# Patient Record
Sex: MALE | Race: WHITE | NOT HISPANIC OR LATINO | Employment: FULL TIME | URBAN - METROPOLITAN AREA
[De-identification: names, ages, dates, MRNs, and addresses within clinical notes are randomized per-mention and may not be internally consistent; named-entity substitution may affect disease eponyms.]

---

## 2017-03-02 ENCOUNTER — AMBULATORY - HEALTHEAST (OUTPATIENT)
Dept: FAMILY MEDICINE | Facility: CLINIC | Age: 27
End: 2017-03-02

## 2017-03-02 ENCOUNTER — COMMUNICATION - HEALTHEAST (OUTPATIENT)
Dept: FAMILY MEDICINE | Facility: CLINIC | Age: 27
End: 2017-03-02

## 2018-01-25 ENCOUNTER — COMMUNICATION - HEALTHEAST (OUTPATIENT)
Dept: SCHEDULING | Facility: CLINIC | Age: 28
End: 2018-01-25

## 2018-01-30 ENCOUNTER — COMMUNICATION - HEALTHEAST (OUTPATIENT)
Dept: FAMILY MEDICINE | Facility: CLINIC | Age: 28
End: 2018-01-30

## 2018-01-30 ENCOUNTER — OFFICE VISIT - HEALTHEAST (OUTPATIENT)
Dept: FAMILY MEDICINE | Facility: CLINIC | Age: 28
End: 2018-01-30

## 2018-01-30 ENCOUNTER — RECORDS - HEALTHEAST (OUTPATIENT)
Dept: ADMINISTRATIVE | Facility: OTHER | Age: 28
End: 2018-01-30

## 2018-01-30 DIAGNOSIS — R07.9 CHEST PAIN, UNSPECIFIED TYPE: ICD-10-CM

## 2018-01-30 ASSESSMENT — MIFFLIN-ST. JEOR: SCORE: 1626.22

## 2018-01-31 ENCOUNTER — COMMUNICATION - HEALTHEAST (OUTPATIENT)
Dept: FAMILY MEDICINE | Facility: CLINIC | Age: 28
End: 2018-01-31

## 2018-01-31 ENCOUNTER — RECORDS - HEALTHEAST (OUTPATIENT)
Dept: ADMINISTRATIVE | Facility: OTHER | Age: 28
End: 2018-01-31

## 2018-02-01 ENCOUNTER — COMMUNICATION - HEALTHEAST (OUTPATIENT)
Dept: SCHEDULING | Facility: CLINIC | Age: 28
End: 2018-02-01

## 2018-02-01 DIAGNOSIS — R52 PAIN: ICD-10-CM

## 2018-02-26 ENCOUNTER — OFFICE VISIT - HEALTHEAST (OUTPATIENT)
Dept: FAMILY MEDICINE | Facility: CLINIC | Age: 28
End: 2018-02-26

## 2018-02-26 DIAGNOSIS — K20.90 ESOPHAGITIS: ICD-10-CM

## 2018-02-26 DIAGNOSIS — K29.60 OTHER GASTRITIS WITHOUT HEMORRHAGE, UNSPECIFIED CHRONICITY: ICD-10-CM

## 2018-02-26 DIAGNOSIS — Z76.89 ENCOUNTER TO ESTABLISH CARE: ICD-10-CM

## 2018-02-26 ASSESSMENT — MIFFLIN-ST. JEOR: SCORE: 1616.69

## 2018-02-27 ENCOUNTER — COMMUNICATION - HEALTHEAST (OUTPATIENT)
Dept: FAMILY MEDICINE | Facility: CLINIC | Age: 28
End: 2018-02-27

## 2018-03-05 ENCOUNTER — COMMUNICATION - HEALTHEAST (OUTPATIENT)
Dept: FAMILY MEDICINE | Facility: CLINIC | Age: 28
End: 2018-03-05

## 2018-03-08 ENCOUNTER — COMMUNICATION - HEALTHEAST (OUTPATIENT)
Dept: FAMILY MEDICINE | Facility: CLINIC | Age: 28
End: 2018-03-08

## 2018-05-14 ENCOUNTER — COMMUNICATION - HEALTHEAST (OUTPATIENT)
Dept: FAMILY MEDICINE | Facility: CLINIC | Age: 28
End: 2018-05-14

## 2018-05-14 ENCOUNTER — COMMUNICATION - HEALTHEAST (OUTPATIENT)
Dept: ADMINISTRATIVE | Facility: CLINIC | Age: 28
End: 2018-05-14

## 2018-05-14 DIAGNOSIS — Z13.220 SCREENING CHOLESTEROL LEVEL: ICD-10-CM

## 2018-05-15 ENCOUNTER — COMMUNICATION - HEALTHEAST (OUTPATIENT)
Dept: FAMILY MEDICINE | Facility: CLINIC | Age: 28
End: 2018-05-15

## 2018-05-23 ENCOUNTER — COMMUNICATION - HEALTHEAST (OUTPATIENT)
Dept: FAMILY MEDICINE | Facility: CLINIC | Age: 28
End: 2018-05-23

## 2018-05-29 ENCOUNTER — OFFICE VISIT - HEALTHEAST (OUTPATIENT)
Dept: FAMILY MEDICINE | Facility: CLINIC | Age: 28
End: 2018-05-29

## 2018-05-29 DIAGNOSIS — T75.3XXA MOTION SICKNESS, INITIAL ENCOUNTER: ICD-10-CM

## 2018-05-29 DIAGNOSIS — J00 ACUTE NASOPHARYNGITIS: ICD-10-CM

## 2018-06-05 ENCOUNTER — COMMUNICATION - HEALTHEAST (OUTPATIENT)
Dept: FAMILY MEDICINE | Facility: CLINIC | Age: 28
End: 2018-06-05

## 2018-07-10 ENCOUNTER — COMMUNICATION - HEALTHEAST (OUTPATIENT)
Dept: FAMILY MEDICINE | Facility: CLINIC | Age: 28
End: 2018-07-10

## 2018-07-10 DIAGNOSIS — J00 ACUTE NASOPHARYNGITIS: ICD-10-CM

## 2018-08-15 ENCOUNTER — COMMUNICATION - HEALTHEAST (OUTPATIENT)
Dept: FAMILY MEDICINE | Facility: CLINIC | Age: 28
End: 2018-08-15

## 2018-12-18 ENCOUNTER — OFFICE VISIT - HEALTHEAST (OUTPATIENT)
Dept: FAMILY MEDICINE | Facility: CLINIC | Age: 28
End: 2018-12-18

## 2018-12-18 DIAGNOSIS — M25.552 HIP PAIN, LEFT: ICD-10-CM

## 2018-12-18 DIAGNOSIS — D36.7 DERMOID CYST OF ARM, LEFT: ICD-10-CM

## 2018-12-18 DIAGNOSIS — L98.9 SKIN LESION: ICD-10-CM

## 2018-12-18 DIAGNOSIS — M25.512 CHRONIC LEFT SHOULDER PAIN: ICD-10-CM

## 2018-12-18 DIAGNOSIS — M79.605 PAIN OF LEFT LOWER EXTREMITY: ICD-10-CM

## 2018-12-18 DIAGNOSIS — M54.2 NECK PAIN: ICD-10-CM

## 2018-12-18 DIAGNOSIS — G89.29 CHRONIC LEFT SHOULDER PAIN: ICD-10-CM

## 2018-12-18 DIAGNOSIS — M25.532 LEFT WRIST PAIN: ICD-10-CM

## 2018-12-20 ENCOUNTER — HOSPITAL ENCOUNTER (OUTPATIENT)
Dept: PHYSICAL MEDICINE AND REHAB | Facility: CLINIC | Age: 28
Discharge: HOME | End: 2018-12-20
Attending: PHYSICAL MEDICINE & REHABILITATION

## 2018-12-20 DIAGNOSIS — M54.16 LUMBAR RADICULITIS: ICD-10-CM

## 2018-12-20 DIAGNOSIS — M54.42 CHRONIC LEFT-SIDED LOW BACK PAIN WITH LEFT-SIDED SCIATICA: ICD-10-CM

## 2018-12-20 DIAGNOSIS — M79.18 MYOFASCIAL PAIN: ICD-10-CM

## 2018-12-20 DIAGNOSIS — G89.29 CHRONIC PERISCAPULAR PAIN ON LEFT SIDE: ICD-10-CM

## 2018-12-20 DIAGNOSIS — G89.29 CHRONIC LEFT-SIDED LOW BACK PAIN WITH LEFT-SIDED SCIATICA: ICD-10-CM

## 2018-12-20 DIAGNOSIS — M25.512 CHRONIC PERISCAPULAR PAIN ON LEFT SIDE: ICD-10-CM

## 2018-12-20 ASSESSMENT — MIFFLIN-ST. JEOR: SCORE: 1627.58

## 2018-12-28 ENCOUNTER — COMMUNICATION - HEALTHEAST (OUTPATIENT)
Dept: PHYSICAL MEDICINE AND REHAB | Facility: CLINIC | Age: 28
End: 2018-12-28

## 2018-12-28 DIAGNOSIS — G89.29 CHRONIC PERISCAPULAR PAIN ON LEFT SIDE: ICD-10-CM

## 2018-12-28 DIAGNOSIS — M54.42 CHRONIC LEFT-SIDED LOW BACK PAIN WITH LEFT-SIDED SCIATICA: ICD-10-CM

## 2018-12-28 DIAGNOSIS — G89.29 CHRONIC LEFT-SIDED LOW BACK PAIN WITH LEFT-SIDED SCIATICA: ICD-10-CM

## 2018-12-28 DIAGNOSIS — M25.512 CHRONIC PERISCAPULAR PAIN ON LEFT SIDE: ICD-10-CM

## 2019-01-04 ENCOUNTER — COMMUNICATION - HEALTHEAST (OUTPATIENT)
Dept: PHYSICAL MEDICINE AND REHAB | Facility: CLINIC | Age: 29
End: 2019-01-04

## 2019-01-04 DIAGNOSIS — G89.29 CHRONIC BILATERAL LOW BACK PAIN WITHOUT SCIATICA: ICD-10-CM

## 2019-01-04 DIAGNOSIS — M54.50 CHRONIC BILATERAL LOW BACK PAIN WITHOUT SCIATICA: ICD-10-CM

## 2019-01-08 ENCOUNTER — COMMUNICATION - HEALTHEAST (OUTPATIENT)
Dept: PHYSICAL MEDICINE AND REHAB | Facility: CLINIC | Age: 29
End: 2019-01-08

## 2019-01-08 DIAGNOSIS — M54.16 LUMBAR RADICULITIS: ICD-10-CM

## 2019-01-08 DIAGNOSIS — M54.12 CERVICAL RADICULITIS: ICD-10-CM

## 2019-04-30 ENCOUNTER — OFFICE VISIT - HEALTHEAST (OUTPATIENT)
Dept: FAMILY MEDICINE | Facility: CLINIC | Age: 29
End: 2019-04-30

## 2019-04-30 DIAGNOSIS — M94.0 COSTOCHONDRITIS: ICD-10-CM

## 2019-06-03 ENCOUNTER — COMMUNICATION - HEALTHEAST (OUTPATIENT)
Dept: PHYSICAL MEDICINE AND REHAB | Facility: CLINIC | Age: 29
End: 2019-06-03

## 2019-06-03 DIAGNOSIS — M54.16 LUMBAR RADICULITIS: ICD-10-CM

## 2019-06-03 DIAGNOSIS — M54.12 CERVICAL RADICULITIS: ICD-10-CM

## 2019-06-04 ENCOUNTER — COMMUNICATION - HEALTHEAST (OUTPATIENT)
Dept: PHYSICAL MEDICINE AND REHAB | Facility: CLINIC | Age: 29
End: 2019-06-04

## 2019-09-22 ENCOUNTER — COMMUNICATION - HEALTHEAST (OUTPATIENT)
Dept: PHYSICAL MEDICINE AND REHAB | Facility: CLINIC | Age: 29
End: 2019-09-22

## 2019-09-22 DIAGNOSIS — M54.16 LUMBAR RADICULITIS: ICD-10-CM

## 2019-09-22 DIAGNOSIS — M54.12 CERVICAL RADICULITIS: ICD-10-CM

## 2020-01-30 ENCOUNTER — COMMUNICATION - HEALTHEAST (OUTPATIENT)
Dept: FAMILY MEDICINE | Facility: CLINIC | Age: 30
End: 2020-01-30

## 2020-02-05 ENCOUNTER — OFFICE VISIT - HEALTHEAST (OUTPATIENT)
Dept: FAMILY MEDICINE | Facility: CLINIC | Age: 30
End: 2020-02-05

## 2020-02-05 DIAGNOSIS — Z71.84 TRAVEL ADVICE ENCOUNTER: ICD-10-CM

## 2020-10-06 ENCOUNTER — COMMUNICATION - HEALTHEAST (OUTPATIENT)
Dept: PHYSICAL MEDICINE AND REHAB | Facility: CLINIC | Age: 30
End: 2020-10-06

## 2020-10-06 DIAGNOSIS — M54.12 CERVICAL RADICULITIS: ICD-10-CM

## 2020-10-06 DIAGNOSIS — M54.16 LUMBAR RADICULITIS: ICD-10-CM

## 2020-11-11 ENCOUNTER — COMMUNICATION - HEALTHEAST (OUTPATIENT)
Dept: PHYSICAL MEDICINE AND REHAB | Facility: CLINIC | Age: 30
End: 2020-11-11

## 2020-11-20 ENCOUNTER — HOSPITAL ENCOUNTER (OUTPATIENT)
Dept: PHYSICAL MEDICINE AND REHAB | Facility: CLINIC | Age: 30
Discharge: HOME | End: 2020-11-20
Attending: PHYSICAL MEDICINE & REHABILITATION

## 2020-11-20 DIAGNOSIS — G89.29 CHRONIC LEFT-SIDED LOW BACK PAIN WITH LEFT-SIDED SCIATICA: ICD-10-CM

## 2020-11-20 DIAGNOSIS — M25.511 ACUTE PAIN OF RIGHT SHOULDER: ICD-10-CM

## 2020-11-20 DIAGNOSIS — M67.911 TENDINOPATHY OF RIGHT ROTATOR CUFF: ICD-10-CM

## 2020-11-20 DIAGNOSIS — M54.16 LUMBAR RADICULITIS: ICD-10-CM

## 2020-11-20 DIAGNOSIS — M54.42 CHRONIC LEFT-SIDED LOW BACK PAIN WITH LEFT-SIDED SCIATICA: ICD-10-CM

## 2020-11-20 DIAGNOSIS — M51.26 LUMBAR DISC HERNIATION: ICD-10-CM

## 2020-11-20 RX ORDER — GABAPENTIN 100 MG/1
CAPSULE ORAL
Qty: 270 CAPSULE | Refills: 1 | Status: SHIPPED | OUTPATIENT
Start: 2020-11-20 | End: 2021-11-16

## 2020-11-20 RX ORDER — GABAPENTIN 300 MG/1
CAPSULE ORAL
Qty: 90 CAPSULE | Refills: 3 | Status: SHIPPED | OUTPATIENT
Start: 2020-11-20 | End: 2022-12-02

## 2020-12-02 ENCOUNTER — COMMUNICATION - HEALTHEAST (OUTPATIENT)
Dept: PHYSICAL MEDICINE AND REHAB | Facility: CLINIC | Age: 30
End: 2020-12-02

## 2021-05-28 NOTE — PROGRESS NOTES
Assessment/Plan:     1. Costochondritis  Discussed this diagnosis.  Reassured patient that there is likely not a fracture given that there was no trauma.  Recommend anti-inflammatories.  He does tolerate ibuprofen, therefore I would recommend 400 to 600 mg twice daily for the next week or so.  Encouraged him to take this with food.  May also try ice and heat application.  Follow-up with any new/worsening symptoms.        Subjective:     Marcial Nolasco is a 28 y.o. male who presents complaints of pain to his sternum.  Symptoms started about 2 to 2.5 months ago.  He cannot recall any specific injury or trauma.  Reports pain to the center of his sternum when he presses on the area, with specific movements, or stretching.  Denies any radiation of pain or shortness of breath.  No fevers or cough.  Symptoms do not seem to be any worse with deep breathing.  He is sleeping well at night.  He does have a history of some GERD and gastritis, but this has not been bothering him.  He is currently only on gabapentin for some back pain that he has been having.  Denies any rash or mass some on his chest.  No over-the-counter treatments tried.  He was previously trialed on diclofenac and meloxicam for his back pain, but he did not tolerate these very well.  He does tolerate ibuprofen fairly well.      The following portions of the patient's history were reviewed and updated as appropriate: allergies, current medications.    Review of Systems  A comprehensive review of systems was performed and was otherwise negative    Objective:     /70   Pulse 60   Wt 152 lb 8 oz (69.2 kg)   BMI 23.19 kg/m      General Appearance: Alert, cooperative, no distress, appears stated age  Back: no CVA tenderness  Lungs: Clear to auscultation bilaterally, respirations unlabored. Tenderness palpated to the center of sternum. No redness, rash, swelling, or mass.   Heart: Regular rate and rhythm, S1 and S2 normal, no murmur, rub, or gallop    Abdomen: Soft, non-tender, bowel sounds active all four quadrants,  no masses, no organomegaly      Malgorzata Jones NP

## 2021-05-29 NOTE — TELEPHONE ENCOUNTER
LMTCB. Nurse line given.  Stated he needs to call back to let us know if he does or does not need a refill.  If he does, how much of this med does he take?

## 2021-05-29 NOTE — TELEPHONE ENCOUNTER
Patient returned call. He states he is taking the medication and is in need of a refill. He reports he is taking 1 capsule two times a day. He finds this dosing is working well for him. Prescription prepped for provider review and authorization. Pharmacy verified.

## 2021-05-31 VITALS — HEIGHT: 67 IN | WEIGHT: 156.1 LBS | BODY MASS INDEX: 24.5 KG/M2

## 2021-05-31 VITALS — WEIGHT: 158.2 LBS | BODY MASS INDEX: 24.83 KG/M2 | HEIGHT: 67 IN

## 2021-06-01 VITALS — BODY MASS INDEX: 23.87 KG/M2 | WEIGHT: 152.4 LBS

## 2021-06-02 VITALS — WEIGHT: 155 LBS | HEIGHT: 68 IN | BODY MASS INDEX: 23.49 KG/M2

## 2021-06-02 VITALS — WEIGHT: 158.5 LBS | BODY MASS INDEX: 24.82 KG/M2

## 2021-06-02 VITALS — BODY MASS INDEX: 23.19 KG/M2 | WEIGHT: 152.5 LBS

## 2021-06-04 VITALS
HEART RATE: 68 BPM | DIASTOLIC BLOOD PRESSURE: 60 MMHG | SYSTOLIC BLOOD PRESSURE: 110 MMHG | OXYGEN SATURATION: 95 % | WEIGHT: 152.25 LBS | BODY MASS INDEX: 23.15 KG/M2

## 2021-06-05 NOTE — TELEPHONE ENCOUNTER
Patient Returning Call  Reason for call:  Patient returning missed clinic staff call.  Information relayed to patient:  Writer relayed clinic staff encounter message as written on refill response.  Patient has additional questions:  Yes  If YES, what are your questions/concerns:  Patient does not feel he should have to make an appointment for an office visit for a medication he uses only when traveling.  Patient would really just like to have a refill without being seen.   Okay to leave a detailed message?: Yes

## 2021-06-05 NOTE — TELEPHONE ENCOUNTER
Medication Request  Medication name:   scopolamine (TRANSDERM-SCOP) 1.5 mg transdermal patch (Discontinued) 4 patch 1 5/29/2018 4/30/2019 No   Sig - Route: Place 1 patch on the skin every third day. - Transdermal   Requested Pharmacy: [id number]  Reason for request: Patient states he is going on Cruise need medication for nausea .   When did you use medication last?:  Unknown   Patient offered appointment:  N/A - electronic request  Okay to leave a detailed message: no  Patient requested a call form clinic up on completion of prescription.

## 2021-06-05 NOTE — PROGRESS NOTES
Assessment:      No contraindications to travel.         Plan:      Issues discussed: environmental concerns, future shots and malaria.  Immunizations recommended: Hepatitis A series.  Total duration of visit: 15 Minutes. Total time spent on education, counseling, coordination of care: 15 Minutes.     Subjective:      Marcial Nolasco is a 29 y.o. male who presents to the Infectious Disease clinic for travel consultation.  He is looking for a new provider.  He is going on a cruise to the [facility] and the main reason he had to urgently be seen for this visit as he needed to get Transderm scopolamine patches.  He will be on the cruise ship in the [facility] for most of the trip and not doing very many ports of call.  He was willing to get hepatitis A vaccine today.    He wanted to talk about sinusitis that was discovered on a CT scan ordered by the dentist but I told him he would need to get established with a new primary care provider who could look into that.  Planned departure date: Feb 7           Planned return date: one week  Countries of travel: [facility]  Areas in country: urban and cruise ports    Accommodations: cruise ship  Purpose of travel: vacation  Prior travel out of [place]: yes  Currently ill / Fever: no  History of liver or kidney disease: no    Data Review:     The following portions of the patient's history were reviewed and updated as appropriate: allergies, current medications, past medical history and problem list.    Review of Systems  A 12 point comprehensive review of systems was negative except as noted.      Objective:      No exam performed today, not indicated.

## 2021-06-05 NOTE — TELEPHONE ENCOUNTER
Left detailed message for patient regarding clinician's message.    Patient has not been seen by his PCP, Jeannette Plata, since 12/18/2018. If patient has not been seen by PCP in over a year, unable to refill medications. Please assist in scheduling office visit if patient would like medication prescribed.

## 2021-06-12 ENCOUNTER — HEALTH MAINTENANCE LETTER (OUTPATIENT)
Age: 31
End: 2021-06-12

## 2021-06-13 NOTE — TELEPHONE ENCOUNTER
"PSP: Dr. Burgos  Last clinic visit: 11/20/20   Reason for call: status update; treatment plan  Clinical information: Patient wanting to inform PSP that the change in the Gabapentin has helped the nausea he was experiencing on higher dose. He is taking 1-2 capsules of the Gabapentin 100 mg two times a day and then 1-3 capsules of the Gabapentin 300 mg. \"It just depends on the day how many I take. It helped with the nausea. It isn't completely gone, but much better and I can handle this.\" Patient also wanted PSP to know he will not be getting the ordered MRI as it is not covered by his insurance.   Advice given to patient: PSP will be updated  Provider to address: [facility]   "

## 2021-06-13 NOTE — PATIENT INSTRUCTIONS - HE
An MRI of your low back is ordered today.  Please check with your insurance company on how much the out-of-pocket cost would be for an MRI at the hospital and an MRI at an outpatient facility.  If you decide to go to an outpatient facility, please call Dr. Burgos's office 2 days after you have had the MRI, so that she can obtain the results and review them.  Her office will call you with the results of your MRI.    Gabapentin 100 mg capsules have been prescribed today.  You can take 1 to 2 capsules in the morning and 1 to 2 capsules in the afternoon.  The lower dose will hopefully make you less drowsy.    You can remain on the gabapentin 300 mg at bedtime.  You can take between 300-900 (1 to 3 capsules) at bedtime to help you sleep.    You would benefit from doing exercises that increase your hip mobility.  These can be yoga based exercises, otherwise there are several personal trainers that have content that showed you how to increase your hip mobility.    You would benefit from doing eccentric exercises for your rotator cuff, for your right shoulder pain.  If this does not significantly improve, you can have an MRI of the shoulder performed if you would like.    A nurse will call you in 2 weeks to see how you are doing. Please do not hesitate to contact the clinic at [phone] if you have any questions/concerns or any worsening of your pain prior to that time.  You are also welcome to contact Dr. Burgos via [employer].

## 2021-06-13 NOTE — PROGRESS NOTES
Assessment:   Marcial Nolasco is a 30 y.o. y.o. male with past medical history significant for cholestasis, GERD, seasonal allergies who presents today for follow-up regarding acute flare of left-sided low back pain without significant sciatica the patient was last seen on December 20, 2018.  Reports that he has had continued flares occurring about 2-3 times per month every month since that time and have been worsening recently.  At this time it is possible that he has a lumbar disc herniation that continues to cause nerve irritation and why he continues to have flares so frequently.  He is also starting to experience some numbness in the left leg (though no sciatica type pain).  He does feel like the left leg is somewhat weak.  Lumbar facet joint syndrome is also in the differential diagnosis.  SI provocative maneuvers were negative on exam today.  He is neurologically intact and without any red flag symptoms.    Also complaining of right shoulder pain, which may be from right rotator cuff tendinopathy.      PSP:  Dr. Burgos     Plan:     A shared decision making plan was used.  The patient's values and choices were respected.  The following represents what was discussed and decided upon by the physician and the patient.      1.  DIAGNOSTIC TESTS/Past records: The patient has not had any imaging of the neck or the low back.  At this point, there is no need to get any imaging of the neck.  -Dr. Burgos did recommend an MRI of the low back for further work-up of the  continued flares over the last 2 years.  Patient would like to proceed with this but does not know if he will be able to secondary to the cost.  He is encouraged to contact his insurance company to find out his out-of-pocket cost both for a hospital-based MRI and for an outpatient based MRI.  The MRI is currently ordered for the hospital.  However if he has a significantly less cost with going to an outpatient facility, he is asked to call the clinic  and the order can be sent to and out patient based MRI facility.    -Dr. Burgos offered the patient an MRI of the right shoulder for further work-up of the right rotator cuff tendinopathy symptoms.  He would like to try exercises first before considering imaging.  - Dr. Burgos reviewed her note from the patient's initial consultation on December 20, 2018.  It is summarized as follows:  Patient seen for left-sided neck pain and periscapular pain as well as left-sided low back pain with radiation into the left buttock.  He declined an EMG at that time.  Imaging was held off on to try conservative treatment first.  He declined a course of physical therapy,.  He was interested in trialing an indomethacin challenge.  Methocarbamol was prescribed.  Injections were not ordered but could be considered in the future if he fails to have relief as expected.  2.  PHYSICAL THERAPY: Patient declines physical therapy at this time stating that he prefers to look up exercises on his own.  He is welcome to do this as he is a strong foundation and will likely do well with a self-directed program.  He is encouraged to look at yoga-based exercises that work on hip mobility, or other strength based programs that work on hip mobility.  He is also encouraged to look at eccentric exercises for the right shoulder, as the eccentric exercises are best for rotator cuff problems.  3.  MEDICATIONS: The patient reports that he has had some side effects with the gabapentin 300 mg.  Dr. Burgos prescribed him to gabapentin 100 mg.  He can take 1 to 200 mg in the morning and in the afternoon.  He can then take between 300-900mg of gabapentin at bedtime (using the 300 mg capsules).  He was told that he should take this medication on a regular basis for the next month, as this will hopefully help to calm down the pain and prevent flares of pain while he does the exercises.  He verbalized understanding.  -Discussed with the patient that given his  significant sensitivity to medications, she does not recommend any other medications, especially opiate medications given the sensitivity of his he has GI system.  He was in agreement.   -The patient reported side effects to indomethacin (lightheadedness/dizziness).  He also reported side effects to Robaxin and nabumetone (lightheadedness and dizziness).  He reported that he had no relief with meloxicam, though he did tolerate this medication.  He reports that he had stomach upset with both diclofenac and sulindac.  4.  INTERVENTIONS: Injections this could potentially be considered depending on his MRI.  There may be a difficult time getting prior authorization if his insurance company requires physical therapy with in 6 months..  However he is diligent in doing his home exercise program substantially they would make an exception.  5.  PATIENT EDUCATION:    -Dr. Burgos discussed hip mobility programs that would be helpful.  If he is concerned about yoga causing increased pain, he is encouraged to use caution.  He should listen to his body and not force his body to do anything that is past its limit.  He should work independently so as not to try to keep up with somebody else.  He feels comfortable being able to look up exercises on the Internet for the hips and the shoulders and this is fine.  -All of his questions were answered to his satisfaction today.  He was in agreement with the treatment plan.  6.  FOLLOW-UP: Nurse navigation is asked to call him in 2 weeks to check and see if he has decided to proceed with the MRI.  If he does go to an MRI facility outside of the system, he is asked to call the physician 2 days after the MRI to have her check for the results.  If he decides not to proceed with the MRI, nurse navigation is asked to check in and see how he is doing with taking the gabapentin on a regular basis and with trying to do exercises on his own at home.  They are asked to relay the information to the  physicians that further recommendations can be made.  If there are any questions/concerns or any significant worsening of pain prior to that time, the patient is asked to call the clinic via the nurse navigation line or via [employer].      Subjective:     Marcial Nolasco is a 30 y.o. male who presents today for follow-up regarding continued left-sided low back pain.  He reports that since he was last seen in January 2019, he has continued to have flares of back pain a few times per month.  Reports that the bad flareups last for 2 to 3 days.  He reports that the first day that it happens, he states that he has a lot of pain and then the next day he tries to do a lot of exercises to alleviate the pain.  Usually on the days that he is trying to do more exercises to alleviate the pain the pain will actually worsen, but then by the third day will stay to improve and then after that is not overly bothersome.  Reports that these flareups are happening 2-3 times a month pretty much every month.  He reports that they may be slightly worse recently.  He denies any radiation of pain going down into the leg, though he is starting to get some numbness in the left leg now that he did not have before.  He does feel like the leg is somewhat weak, but states that this weakness is fairly mild and may be more related to the numbness that he is experiencing.  He is rating his pain at a 1 or 2 out of 10 today.  At worst it is a 5 or 6 out of 10.  At best it is a 1 out of 10.  He reports that his pain is worse with not moving.  Sitting for long periods of time and sleeping, especially sleeping on his side seem to aggravate the pain.  He does feel better if he can be moving around.  He is tried heat, ice, stretching and exercises.  He does note that if he does not exercise, his pain will be worse.  He has been very active with lifting weights and stretching.  He has been taking gabapentin as needed in addition to ibuprofen as needed.  He is  wondering what can be done to either prevent the flareups or what to he can take during the flareups.    He also is complaining of right shoulder pain.  He denies any injury.  This is been going on for about a month.  He states that over the summer he was trying to start his friend's boat and it would not started he thinks that maybe this repetitive motion aggravated the shoulder pain.  He has had issues in the past with his left shoulder pain, and he thinks that he may have aggravated this recently, but his main concern is the right shoulder pain.  Especially when he abducts his shoulder, he gets a lot of pain.  He points to pain over the AC joint and the top of the deltoid.    Past medical history is reviewed and is unchanged for any new medical diagnoses in the interim.      Family history is reviewed and is unchanged in the interim.        Review of Systems:  Positive for numbness and tingling as well as weakness in the left leg.  Positive for occasional headaches and pain that makes it difficult to sleep at night..  Pertinent negatives include no fevers, chills, unexplained weight loss, bowel incontinence, bladder incontinence, trips, stumbles, falls.  All others reviewed and are negative.     Objective:   CONSTITUTIONAL:  Vital signs as above.  No acute distress.  The patient is well nourished and well groomed.    PSYCHIATRIC:  The patient is awake, alert, oriented to person, place and time.  The patient is answering questions appropriately with clear speech.  Normal affect.  SKIN:  Skin over the face, neck bilateral upper extremities is clean, dry, intact without rashes.  MUSCULOSKELETAL: Gait is non-antalgic.  The patient is able to heel and toe walk without any difficulty.  Mild tenderness over the left lower lumbar paraspinal muscles.  No significant tenderness over the right lumbar paraspinal muscles.     The patient has 5/5 strength for the bilateral hip flexors, knee flexors/extensors, ankle  dorsiflexors/plantar flexors, ankle evertors/invertors.  Positive empty can test on the right side for reproduction of right shoulder pain.  Positive Deras test on the right side for right shoulder pain.  He does not have any significant tenderness over the upper trapezius muscle on the right side.  Mild tenderness over the right AC joint.  Negative straight leg raising test bilaterally.  The patient does have restricted range of motion of the bilateral hips with flexion, internal/external rotation.  Fabere's test is negative bilaterally.  NEUROLOGICAL:      Absent patellar, with absent medial hamstring, 1/4 Achilles reflexes bilaterally.  Sensation to light touch is intact in the bilateral L4, L5, and S1 dermatomes.  No ankle clonus bilaterally.

## 2021-06-13 NOTE — TELEPHONE ENCOUNTER
Patient had called and left message on nurse navigation line at 216 PM regarding prescription being not approved.    Returned patient's call. Explained that as he has not been seen in clinic since 12/20/18 he needs to come in for an office visit or he could have his PCP take it over. Reports he currently does not have a PCP.  [facility] would [facility]. Transferred to scheduling to make appointment.      He has been taking Gabapentin on as needed basis 2-3 capsules a day and then some days none at all. He has about 20 pills left. He continues to have the neck and back issues.

## 2021-06-15 NOTE — PROGRESS NOTES
"  Office Visit - Follow Up   Marcial Nolasco   27 y.o. male    Date of Visit: 1/30/2018    Chief Complaint   Patient presents with     Medication Refill        Assessment and Plan   1. Chest pain, unspecified type  Encouraged patient to use acetaminophen for pain and he should follow up with GI for EGD result and possible long term treatment. Patient verbalized understanding. Discuss need to monitor diet to help reduce symptoms of GERD.  - acetaminophen (TYLENOL EXTRA STRENGTH) 500 MG tablet; Take 2 tablets (1,000 mg total) by mouth every 6 (six) hours as needed for pain.  Dispense: 60 tablet; Refill: 3       History of Present Illness   This 27 y.o. old male patient with history of ADHD presents to the clinic for a follow up and a refill of his pain medication. Patient was seen at the ED 5 days ago for complaint of chest pain. All the testing and evaluation was unremarkable. Patient was asked to follow up with GI as he was earlier seen at the ED for reflux disease. Patient was also prescribed oxycodone for pain management. Patient had his EGD done today and he is here asking for a refill of oxycodone. He reports that pain is in his chest, pain is described as mild to moderate and its intermediate     Review of Systems: A 12 point comprehensive review of systems was negative except as noted.     Medications, Allergies and Problem List   Reviewed and updated     Physical Exam   General Appearance: Well groomed    /77  Pulse 72  Ht 5' 7\" (1.702 m)  Wt 158 lb 3.2 oz (71.8 kg)  SpO2 98%  BMI 24.78 kg/m2  Physical Examination: General appearance - alert, well appearing, and in no distress  Eyes: pupils equal and reactive, extraocular eye movements intact  Mouth: mucous membranes moist, pharynx normal without lesions  Neurological: alert, oriented, normal speech, no focal findings or movement disorder noted  Extremities: No edema, no clubbing or cyanosis  Psychiatric: Normal affect. Does not appear anxious " or depressed.       Additional Information   Current Outpatient Prescriptions   Medication Sig Dispense Refill     dexlansoprazole (DEXILANT) 30 mg capsule Take 30 mg by mouth daily.       oxyCODONE (ROXICODONE) 5 MG immediate release tablet Take 1 tablet (5 mg total) by mouth every 4 (four) hours as needed for pain. 20 tablet 0     ranitidine (ZANTAC) 150 MG tablet Take 1 tablet (150 mg total) by mouth 2 (two) times a day. 30 tablet 0     acetaminophen (TYLENOL EXTRA STRENGTH) 500 MG tablet Take 2 tablets (1,000 mg total) by mouth every 6 (six) hours as needed for pain. 60 tablet 3     ibuprofen (ADVIL,MOTRIN) 200 MG tablet Take 200 mg by mouth every 6 (six) hours as needed for pain.       montelukast (SINGULAIR) 10 mg tablet TAKE ONE TABLET BY MOUTH NIGHTLY AT BEDTIME 60 tablet 6     scopolamine (TRANSDERM-SCOP) 1.5 mg (1 mg over 3 days) transdermal patch Place 1 patch on the skin every third day. 4 patch 1     sucralfate (CARAFATE) 1 gram tablet Take 2 tablets (2 g total) by mouth 2 (two) times a day for 14 days. 56 tablet 0     triamcinolone (KENALOG) 0.1 % cream Apply twice daily 60 g 1     No current facility-administered medications for this visit.      No Known Allergies  Social History   Substance Use Topics     Smoking status: Never Smoker     Smokeless tobacco: Never Used     Alcohol use None       Reviewed ED report including EKG, chest ray and lab results and confirmed atypical chest pain    Time: total time spent with the patient was 15 minutes of which >50% was spent in counseling and coordination of care     Jeannette Plata, CNP

## 2021-06-16 PROBLEM — K29.70 GASTRITIS: Status: ACTIVE | Noted: 2018-02-26

## 2021-06-16 PROBLEM — K20.90 ESOPHAGITIS: Status: ACTIVE | Noted: 2018-02-26

## 2021-06-16 NOTE — PROGRESS NOTES
Office Visit - New Patient   Marcial Nolasco   27 y.o.  male    Date of visit: 2/26/2018  Physician: Jeannette Plata CNP     Assessment and Plan   1. Encounter to establish care  2. Esophagitis  3. Other gastritis without hemorrhage, unspecified chronicity  Reviewed EGD and biopsy results from [facility] and confirmed diagnosis of Eosinophilic esophagitis, Gastritis, Negative for H-pylori. Patient was encouraged to continue with current pharmacotherapy. Patient can take 60 mg of Dexilant for 7 to 30 days then step down to 30 mg daily for 6 months.   - dexlansoprazole (DEXILANT) 30 mg capsule; Take 2 capsules (60 mg total) by mouth daily.  Dispense: 60 capsule; Refill: 0  - ranitidine (ZANTAC) 150 MG tablet; Take 1 tablet (150 mg total) by mouth 3 (three) times a day.  Dispense: 90 tablet; Refill: 0    Also discuss Lifestyle changes. Making the following changes can help reduce irritation and ease your symptoms:    Avoid spicy foods (pepper, chili powder, hawkins). Also avoid hard foods (nuts, crackers, raw vegetables) and acidic foods and drinks (tomatoes, citrus fruits and juices). Other problem foods include chocolate, peppermint, nutmeg, and foods high in fat.    Until you can swallow without pain, follow a combined liquid and soft diet. Try foods such as cooked cereals, mashed potatoes, and soups.    Take small bites and chew your food thoroughly.    Avoid large meals and heavy evening meals. Don't lie down within 2 to 3 hours of eating.    Get to or stay at a healthy weight.    Avoid alcohol, caffeine, and smoking or tobacco products.    Brush and floss your teeth    Raise your upper body by 4 to 6 inches when lying in bed. This can be done using a foam wedge. Or put blocks under the legs at the head of your bed.       Return in about 4 weeks (around 3/26/2018).     Chief Complaint   Chief Complaint   Patient presents with     Establish Care     pt c/o throat still hurting        Patient  "Profile   Social History     Social History Narrative        Past Medical History   Patient Active Problem List   Diagnosis     Sinus Bradycardia     ADHD, Predominantly Inattentive Type     Esophagitis     Gastritis       Past Surgical History  He has a past surgical history that includes Liver biopsy (2012).     History of Present Illness   This 27 y.o. old male patient with history of ADHD, GERD and cholestasis was seen at the ED on two different occasions with complaint of chest pain. All workup was negative for cardiac etiology. Patient was then referred to GI where EGD was performed and Gastritis and Esophagitis was confirmed. Patient was started on extended release PPI. He returns today to establish care and symptom management. He reports that symptoms have improved since leaving the ED but he continuous to have mild pain during the day. He reports that pain comes and goes and its worse with food or liquid ingestion. He reports that he has made few changes to his diet. He denied bloody stool, black stool, shortness of breath, fever, chills, nausea and vomiting     Review of Systems: A 12 point comprehensive review of systems was negative except as noted.     Medications and Allergies   Current Outpatient Prescriptions   Medication Sig Dispense Refill     dexlansoprazole (DEXILANT) 30 mg capsule Take 2 capsules (60 mg total) by mouth daily. 60 capsule 0     ranitidine (ZANTAC) 150 MG tablet Take 1 tablet (150 mg total) by mouth 3 (three) times a day. 90 tablet 0     No current facility-administered medications for this visit.      No Known Allergies     Family and Social History   Family History   Problem Relation Age of Onset     Heart disease Father         Social History   Substance Use Topics     Smoking status: Never Smoker     Smokeless tobacco: Never Used     Alcohol use No        Physical Exam   General Appearance: Well groomed    /64  Pulse 72  Ht 5' 7\" (1.702 m)  Wt 156 lb 1.6 oz (70.8 kg)  " SpO2 99%  BMI 24.45 kg/m2    EYES: Eyelids, conjunctiva, and sclera were normal. Pupils were normal. Cornea, iris, and lens were normal bilaterally.  HEAD, EARS, NOSE, MOUTH, AND THROAT: Head and face were normal. Hearing was normal to voice and the ears were normal to external exam. Nose appearance was normal and there was no discharge. Oropharynx was normal.  NECK: Neck appearance was normal. There were no neck masses and the thyroid was not enlarged.  RESPIRATORY: Breathing pattern was normal and the chest moved symmetrically.  Percussion/auscultatory percussion was normal.  L  MUSCULOSKELETAL: Skeletal configuration was normal and muscle mass was normal for age. Joint appearance was overall normal.  LYMPHATIC: There were no enlarged nodes.  SKIN/HAIR/NAILS: Skin color was normal.  There were no skin lesions.  Hair and nails were normal.  NEUROLOGIC: The patient was alert and oriented to person, place, time, and circumstance. Speech was normal.   PSYCHIATRIC:  Mood and affect were normal and the patient had normal recent and remote memory. The patient's judgment and insight were normal.       Additional Information     Reviewed EGD report from [facility] and confirmed positive esophagitis and gastritis     Time: total time spent with the patient was 30 minutes of which >50% was spent in counseling and coordination of care     Jeannette Plata CNP  Family Nurse Practitioner  [facility]  [phone]  [email or web address]

## 2021-06-17 NOTE — PATIENT INSTRUCTIONS - HE
Patient Instructions by Malgorzata Jones NP at 4/30/2019  9:00 AM     Author: Malgorzata Jones NP Service: -- Author Type: Nurse Practitioner    Filed: 4/30/2019  9:05 AM Encounter Date: 4/30/2019 Status: Signed    : Malgorzata Jones NP (Nurse Practitioner)       Patient Education     Costochondritis    Costochondritis is inflammation of a rib or the cartilage that connects a rib to your breastbone (sternum). It causes tenderness, and sometimes chest pain may be sharp or aching, or it may feel like pressure. Pain may get worse with deep breathing, movement, or exercise. In some cases, the pain is mistaken for a heart attack. Despite this, the condition is not serious. Read on to learn more about the condition and how it can be treated.  What causes costochondritis?  The cause of costochondritis is not completely clear, but it may happen after a chest injury, chest infection, or coughing episode. Some physical activities can sometimes lead to costochondritis. Large-breasted women may be more likely to have the condition. Often, the reason for the inflammation is unknown.  Diagnosing costochondritis  There is no test for costochondritis. The condition is diagnosed by the symptoms you have. Your healthcare provider will perform a physical exam. He or she will ask you about your symptoms and examine your chest for tenderness. In some cases, tests are done to rule out more serious problems. These tests may include imaging tests such as chest X-ray, CT scan, or an ECG.  Treating costochondritis  If an underlying cause is found, treatment for that will likely relieve the problem. Costochondritis often goes away on its own. The course of the condition varies from person to person. It usually lasts from weeks to months. In some cases, mild symptoms continue for months to years. To ease symptoms:    Take medicine as directed. These relieve pain and swelling. Ibuprofen or other NSAIDs are often recommended. In some  cases, you may be given prescription medicine, such as muscle relaxants.    Avoid activities that put stress on the chest or spine.    Apply a heating pad (set to warm, not too high, heat) to the breastbone several times a day.    Perform stretching exercises as directed.  Call the healthcare provider right away if you have any of the following:    Pain that is not relieved by medicine    Shortness of breath    Lightheadedness, dizziness, or fainting    Feeling of irregular heartbeat or fast pulse  Anyone with chest pain should see a healthcare provider, especially those who are older and may be at risk for heart disease.   Date Last Reviewed: 10/1/2016    [id number] [employer]. [address] [place] [address], [place] [address]. All rights reserved. This information is not intended as a substitute for professional medical care. Always follow your healthcare professional's instructions.

## 2021-06-18 NOTE — PROGRESS NOTES
Assessment:     1. Acute nasopharyngitis  - loratadine (CLARITIN) 10 mg tablet; Take 1 tablet (10 mg total) by mouth daily.  Dispense: 30 tablet; Refill: 0  - fluticasone (FLONASE ALLERGY RELIEF) 50 mcg/actuation nasal spray; 1 spray into each nostril 2 (two) times a day.  Dispense: 16 g; Refill: 0    2. Motion sickness, initial encounter  - scopolamine (TRANSDERM-SCOP) 1.5 mg transdermal patch; Place 1 patch on the skin every third day.  Dispense: 4 patch; Refill: 1     Plan:   Discussed diagnosis and treatment of URI.  Suggested symptomatic OTC remedies.  Nasal saline spray for congestion.  Nasal steroids per orders.  Follow up as needed.     Subjective:   History was provided by the patient.  Marcial Nolasco is a 27 y.o. male who presents for evaluation of symptoms of a URI. Symptoms include nasal blockage, yellow nasal discharge, post nasal drip and sinus and nasal congestion. Onset of symptoms was 2 weeks ago, unchanged since that time. Associated symptoms include congestion.  He is drinking moderate amounts of fluids. Evaluation to date: none. Treatment to date: none. Patient also reports that he will be going on a cruise and he usually get motion sickness.     The following portions of the patient's history were reviewed and updated as appropriate: allergies, current medications, past family history, past medical history, past social history, past surgical history and problem list.    Review of Systems  A 12 point comprehensive review of systems was negative except as noted.      Objective:   /64 (Patient Site: Right Arm, Patient Position: Sitting, Cuff Size: Adult Regular)  Pulse 60  Temp 98.7  F (37.1  C)  Wt 152 lb 6.4 oz (69.1 kg)  BMI 23.87 kg/m2  General appearance: alert, appears stated age and cooperative  Head: Normocephalic, without obvious abnormality, atraumatic  Eyes: conjunctivae/corneas clear. PERRL, EOM's intact. Fundi benign.  Ears: normal TM's and external ear canals both  ears  Nose: Nares normal. Septum midline. Mucosa normal. No drainage or sinus tenderness.  Throat: lips, mucosa, and tongue normal; teeth and gums normal  Lungs: clear to auscultation bilaterally  Heart: regular rate and rhythm, S1, S2 normal, no murmur, click, rub or gallop  Pulses: 2+ and symmetric  Skin: Skin color, texture, turgor normal. No rashes or lesions  Lymph nodes: Cervical, supraclavicular, and axillary nodes normal.  Neurologic: Grossly normal

## 2021-06-22 NOTE — PROGRESS NOTES
ASSESSMENT: Marcial Nolasco is a 28 y.o. male  with a BMI of Body mass index is 23.57 kg/m . with past medical history significant for cholestasis, GERD, seasonal allergies who presents today for new patient evaluation of left periscapular pain which has a large myofascial pain component.  Patient does have some referred pain going down into the left arm, concentrating in the left wrist with some numbness and tingling.  This may be from carpal tunnel syndrome as he did have positive carpal tunnel provocative maneuver.  The patient also has left-sided low back pain with pain radiating to the left buttock.  He also some pain in the left ankle with some numbness there.  It is difficult to tell if the symptoms in that extremity are referred or radicular/sciatica.  He is neurologically intact and without any red flag symptoms.    RAMESH: 28%  NDI:  24%  WHO-5: 12 (the patient is not interested in behavioral health services)    PLAN:  A shared decision making model was used.  The patient's values and choices were respected.  The following represents what was discussed and decided upon by the physician and the patient.      1.  DIAGNOSTIC TESTS: No diagnostic testing necessary at this time as the patient is neurologically intact and without any red flag symptoms.  An MRI would not change the treatment plan at this time.  Can consider imaging in the future.  Would start with an MRI of the low back.  If he needs imaging for the periscapular symptoms, could potentially consider an MRI of either the shoulder or the neck.  At this time neither the neck exam nor the shoulder exam warrants further imaging.  -The patient was offered an EMG of the left upper extremity to look for carpal tunnel syndrome.  He declined at this time.  If he changes his mind in the future, an EMG of the left upper extremity can be ordered to look for carpal tunnel syndrome versus cervical radiculopathy.  2.  PHYSICAL THERAPY: The patient was offered a  [Smiles spontaneously] : smiles spontaneously course of physical therapy to focus mainly on stretching and range of motion.  He has done extensive strengthening on his own and does not feel that further physical therapy would be beneficial.  Dr. Burgos stated that new physical therapy would focus on different things, but the patient still declined.  If he changes his mind in the future, an order can be provided.  Otherwise he is encouraged to continue working on stretching and range of motion on his own.  3.  MEDICATIONS:    -Patient is very interested in prescription medication options as he feels that he has exhausted all over-the-counter options.  An indomethacin challenge is prescribed today.  He can take indomethacin 75 mg 1 tablet in the morning with breakfast and 1 tablet with dinner.  He is encouraged to take this medication with food/water as it can cause stomach upset otherwise.  He may want to take a reflux medication while he is taking indomethacin to prevent any stomach upset.  If he does have any side effects to the indomethacin, he is asked to contact the clinic.  He should refrain from taking any over-the-counter NSAIDs such as ibuprofen/Advil/Motrin/naproxen/Aleve/aspirin while he is taking the indomethacin.  -After he completes the indomethacin challenge, he is encouraged to take nabumetone 500 mg 1 tablet every 8 hours as needed for pain.  Again he should refrain from taking any over-the-counter NSAIDs.  He should take nabumetone with food/water to prevent any stomach upset.  He should call the clinic if he has any side effects to nabumetone.  -Patient was offered a muscle relaxant medication to see if this will help.  He has tried cyclobenzaprine in the past but did not feel it was effective.  Methocarbamol 500 mg is prescribed today.  He can take 1 or 2 tablets at bedtime to help him sleep.  He can take 1 or 2 tablets in the morning and in the afternoon as needed.  He is cautioned that this medication can cause drowsiness.  He should not  [Regards face] : regards face work or drive until he knows how it affects him.  -Dr. Burgos will consider gabapentin in the future if his symptoms do not improve with the nabumetone and the methocarbamol.  However she did not want to start with too many medications at once.  4.  INTERVENTIONS: No injections are recommended at this time as patient's pain seems to be mainly from myofascial pain in etiology.  The point injections could be considered in the future, however these would likely have to be done under ultrasound guidance for either the scalenes or for the periscapular area to avoid neurovascular bundles and the lung in the periscapular area.  -Injections could be considered for the low back if he fails to have relief with the medications.  Made an MRI of the lumbar spine prior to any injections.  He may benefit from lumbar medial branch blocks if there is no nerve root compression seen on his MRI.  5.  PATIENT EDUCATION:   -Discussed with patient that it is a little bit difficult to know exactly what is causing the periscapular pain and the anterior neck pain.  However does appear to be a myofascial pain component.  Emphasized the importance of stretching.  Explained to the patient that it does not seem to be a problem with the neck or with the shoulder, as his exam was largely normal for both the neck and the shoulder.  -Discussed with the patient that it is also difficult to know exactly what is causing the back pain.  There does appear to be a mechanical component.  Again he may benefit from doing increased stretching and range of motion exercises.  6.  FOLLOW-UP:   The patient was offered a face-to-face follow-up appointment, but he preferred to have the nurse team call him in 2 weeks.  He asked that they call his cell phone at [phone].  They can leave a message.  The nurse is asked to update the physician with his status at that time after trying the medications.  He is welcome to contact the physician if he has any questions,  [Follows to midline] : follows to midline concerns, or any significant worsening of his pain.      SUBJECTIVE:  Marcial Nolasco  Is a 28 y.o. male who presents today for new patient evaluation of chronic left periscapular pain with radiation into the left arm as well as chronic left low back pain.  The patient reports that he has pain in both areas, and he cannot really state that one area is worse than the other.  He has had these areas of pain for about 15 years.  He reports that he has been a very active person.  He does not number any activity that stands out is something that would have triggered the pain.  He does remember falling on his head once with snowboarding.  He was never evaluated afterwards and does not remember having significant pain attributed to that incident, but wonders now if the cumulative effect could be playing a role in his current symptoms.  Starting with the pain in the left periscapular area, he states that it seems to concentrate near the left shoulder blade and then anteriorly just above the collarbone.  The pain goes into the upper back and then along the left upper trapezius muscle with some minor pain in the neck.  Patient says that the main pain is in the shoulder blade and just above the collarbone.  The patient has seen a massage therapist for this and the massage therapist who told him that these areas are very tense.  He does get some pain going down the arm and it seems to concentrate in the left wrist.  He notices quite a bit of clicking in the left wrist.  He does have some occasional numbness in the wrist.  He has some rare numbness and tingling in the fingers.  He denies any weakness in the left upper extremity.  No significant symptoms in the right side.  The patient reports that these symptoms in the periscapular area seem to be worse with looking at screens, torquing his body in certain areas and doing activities that he is not used to doing.  If he lays down, he can typically find a comfortable position.   However it is difficult to sleep as there does not appear to be a position that he can sleep and be comfortable for long periods.    Guarding the left-sided low back pain, he does get some radiation into the buttock.  He has constant numbness in the left ankle.  He notes that the left ankle cracks quite a bit.  If he cracks it, he will get relief temporarily.  He says that the left ankle cracks much more than the right ankle.  The patient does feel that the left low back and left buttock pain are related and that the left ankle pain is related to those areas of pain as well.  The patient's back pain is worse with sitting, particularly if he sits with his left leg crossed over the right.  Torquing his body will also aggravate the pain.  He does feel little bit better if he leans to the left side.  Once he comes back to neutral position, he will notice increased pain.    The patient reports that he did physical therapy several years ago.  He did not find it was beneficial.  He states that he can do other exercises on his own that are just as advanced as a physical therapy.  He does a fairly intense strengthening program.  He does an extensive stretching program on his own.  He tried chiropractic manipulation several years ago but it did not provide any relief.  He has not had any injections.    Patient has tried several over-the-counter medications including naproxen, aspirin, ibuprofen, acetaminophen, topical gels.  None of these have provided significant relief.  He has tried cooling packs without relief.    The patient reports that he is seeking care at this time because he has started to notice changes in his mood caused by the pain.  He says that the pain significantly affects his sleep.  He notices that he has been complaining to others about the pain, and this is not like him to complain about pain.      Medications:  Reviewed and correct in the chart.      Allergies: Reviewed and NKDA per patient.    PMH:   [Follows past midline] : follows past midline Reviewed and significant for cholestasis    PSH:  Reviewed and significant for distant tooth extraction, liver biopsy.    Family History:  Reviewed and significant for heart disease.    Social History: The patient drinks alcohol occasionally.  He denies any tobacco or illicit drug use.  The patient is  and works as a clinical review technician.  This is a sedentary job.    ROS: Positive for anxiety related to the pain, poor sleep quality related to the pain, back pain, joint pain, leg pain, occasional headache, depression.  Specifically negative for bowel/bladder dysfunction, fevers,chills, appetite changes, unexplained weight loss.   Otherwise 13 systems reviewed are negative.  Please see the patient's intake questionnaire from today for details.      OBJECTIVE:  PHYSICAL EXAMINATION:  CONSTITUTIONAL:  Vital signs as above.  No acute distress.  The patient is well nourished and well groomed.  PSYCHIATRIC:  The patient is awake, alert, oriented to person, place, time and answering questions appropriately with clear speech.   HEENT:  Sclera are non-injected.  Extraocular muscles are intact. Moist oral mucosa.   SKIN:  Skin over the face, bilateral lower extremities, and posterior torso is clean, dry, intact without rashes.    GAIT:  Gait is non-antalgic.  The patient is able to heel and toe walk without significant difficulty.    STANDING EXAMINATION: The patient does report some reproduction of left-sided low back pain with lumbar flexion and some pain with lumbar extension.  Patient does have some pain in the right side of his low back with right lumbar sidebending.  He has some minimal pain with left lumbar sidebending.  No significant pain with bilateral upper body trunk rotation.  The patient does have some mild pain with bilateral lumbar facet loading (simultaneous extension and rotation).    MUSCLE STRENGTH:  The patient has 5/5 strength for the bilateral hip flexors, knee flexors/extensors, ankle  [Lifts Head] : lifts head [Passed] : passed dorsiflexors/plantar flexors, great toe extensors, ankle evertors/invertors.  5/5 strength for the bilateral shoulder abductors, elbow flexors/extensors, wrist extensors, finger flexors/abductors.  NEUROLOGICAL:  2/4 patellar, medial hamstring, and achilles reflexes bilaterally.  Downgoing Babinski's bilaterally.  No ankle clonus bilaterally. Sensation to light touch is intact in the bilateral L4, L5, and S1 dermatomes.  2/4 symmetric biceps, brachioradialis, triceps reflexes bilaterally.  Sensation to pinprick is intact in all the digits of both upper extremities.  Negative Marcial's bilaterally.    VASCULAR:  2/4 dorsalis pedis pulses bilaterally.  Bilateral lower extremities are warm.  There is no pitting edema of the bilateral lower extremities.  2/4 radial pulses bilaterally.  Warm upper limbs bilaterally.  Capillary refill in the upper extremities is less than 1 second.  ABDOMINAL:  Soft, non-distended, non-tender throughout all quadrants.  No pulsatile mass palpated in the left lower quadrant.  LYMPH NODES:  No palpable or tender inguinal lymph nodes.  No palpable or tender anterior/posterior cervical, submandibular, or supraclavicular lymph nodes.   MUSCULOSKELETAL: Negative straight leg raising test bilaterally.  The patient does not have any pain with right hip range of motion testing with the hip in a flexed position testing internal/external rotation.  With left hip range of motion testing, with the hip in a flexed position, he does get some pain in the left low back with internal and external rotation.  Fabere's test is negative on the left side.  Gaenslen's test is negative bilaterally.  Standing stork test is negative bilaterally.  The patient has largely normal range of motion of the cervical spine with flexion, extension, sidebending, rotation.  He has no significant pain with these motions.  Patient does have some mild tenderness over the bilateral cervical paraspinal muscles and over the left  upper trapezius muscle compared to the right.  There is mild hypertonicity of both upper trapezius muscles.  The patient does have pain over the left thoracic paraspinal muscles from T3 down to approximately T8.  He has mild tenderness over the infraspinatus muscle.  He reports that he has more tenderness over the left thoracic paraspinal muscles compared to the infraspinatus muscle with palpation.  The patient has normal active range of motion of the bilateral shoulders with flexion, extension, abduction, internal/external rotation.  Negative empty can sign bilaterally.  Negative Deras test on the left.  Positive carpal compression test on the left.

## 2021-06-22 NOTE — TELEPHONE ENCOUNTER
Relayed this to patient.  He is happy to hear that it may still work, and will continue taking it.  He is instructed to follow up on 1/18, but may call sooner just to update us.     independent

## 2021-06-22 NOTE — TELEPHONE ENCOUNTER
Phone call to patient to discuss. Information given to patient. Stated understanding and appreciation for call back.

## 2021-06-22 NOTE — PROGRESS NOTES
"Assessment:   1. Neck pain  2. Chronic left shoulder pain  3. Left wrist pain  4. Hip pain, left  5. Pain of left lower extremity  Informed patient of my findings during assessment. No sign of acute injury. I discussed the need  For further evaluation including imaging. Patient verbalized understanding of these issues, agrees with the plan and all questions were answered today. Patient was given an opportuntity to voice any other symptoms or concerns not listed above. Patient did not have any other symptoms or concerns.  - Ambulatory referral to [facility]    6. Skin lesion  Assured patient that this is benign and he should continue to monitor.    7. Dermoid cyst of arm, left  Assured patient that this is benign and he should continue to monitor.     Plan:   Natural history and expected course discussed. Questions answered.  Proper lifting, bending technique discussed.  Stretching exercises discussed.  Ice to affected area as needed for local pain relief.  Heat to affected area as needed for local pain relief.  NSAIDs per medication orders.  OTC analgesics as needed.  Follow-up in 2 weeks.     Subjective:   Marcial Nolasco is a 28 y.o. male who presents for evaluation of left low back pain. He is also complaining of left shoulder pain, hip pain, wrist pain and left lower extremity . The patient reports that  pain has been ongoing for the last 15 years and until now he has been able to manage it through various over the counter devices, and exercising. Onset was related to / precipitated by no known injury. The pain is located in the left lumbar area, waist, across the lower back or shoulder, wrist and  radiates to the left foot. The pain is described as aching and occurs all day. He rates his pain as moderate. Symptoms are exacerbated by exercise. Symptoms are improved by nothing.  He has no other symptoms associated with the back pain. The patient has no \"red flag\" history indicative of complicated back " pain.    The following portions of the patient's history were reviewed and updated as appropriate: allergies, current medications, past family history, past medical history, past social history, past surgical history and problem list.    Review of Systems  Pertinent items are noted in HPI.      Objective:    Full range of motion without pain, no tenderness, no spasm, no curvature.  Normal reflexes, gait, strength and negative straight-leg raise.  Inspection and palpation: inspection of back is normal.  Muscle tone and ROM exam: muscle tone normal without spasm.  Straight leg raise: negative at 85 degrees bilaterally.  Neurological: normal DTRs, muscle strength and reflexes.

## 2021-06-22 NOTE — TELEPHONE ENCOUNTER
Phone call to patient to inform him a new prescription (Meloxicam) has been sent in to his pharmacy. Instructed on usage. Stated understanding and appreciation for call back.

## 2021-06-22 NOTE — TELEPHONE ENCOUNTER
The patient has had side effects to Indomethacin, Nabumetone, Sulindac, Methocarbamol/Robaxin.  At this point, Meloxicam 7.5 mg 1 tab po q 12 hours PRN pain can be prescribed.  If he fails to have relief with this medication or if he has side effects to this medication, he will need to return for a follow-up appointment or he can have a referal to the pain clinic.  It is highly unusual for a patient his age to have side effects to all of these medications.

## 2021-06-22 NOTE — TELEPHONE ENCOUNTER
Patient calling to report he is noting the nausea with the newest medication prescribed (Sulindac). He reports he is not finding it to be helpful with pain relief either. Patient states he is not one to be sensitive or react to medications. Has taken Ibuprofen and Naproxen in the past without the stomach issues. With the weekend coming he is hoping to get some relief. Please advise.

## 2021-06-22 NOTE — TELEPHONE ENCOUNTER
Patient needs to give the meloxicam more time to work.   If he has not had any relief at 2 weeks of being on the meloxicam, that he can contact the clinic.  Dr. Burgos can prescribe gabapentin.  If the gabapentin gives him side effects or if he is not having any relief with the gabapentin then she can refer him to the pain clinic.  Otherwise he is welcome to take a referral to the pain clinic at this time.  There is nothing else that can be done unless he wants to come in for an appointment.

## 2021-06-22 NOTE — TELEPHONE ENCOUNTER
Patient called in to inform provider that he is not getting relief with the Diclofenac. It is also making him sick to his stomach despite taking with food and water. He does state it is less severe than when he was on the Indomethacin. Has had a total of 6 doses. Please advise. Pharmacy updated.

## 2021-06-22 NOTE — TELEPHONE ENCOUNTER
"Phone call from patient.   Wants to report that meloxicam is not working.  He is not having side effects from it as with previous meds, just no relief.  Discussed follow up as advised by Dr. Burgos vs pain clinic referral.  Patient has a high deductible insurance plan, so is paying for 100% of visits and meds right now.  He is wondering if Dr. Burgos thinks that there might be another option for him with her, or if it would be more \"bang for his byers\" to go straight to pain.  He is not interested in the EMG at this time.  He would prefer a rx without follow up, but explained that due to the high number of reactions that he has had, she would like to see him in person.  Thank you!    "

## 2021-06-22 NOTE — TELEPHONE ENCOUNTER
If patient has side effects to Diclofenac, he should stop the medication.      Sulindac 150 mg po q 12 hours PRN pain is prescribed in it's place.  He should take this medication with food/water.  He should call if he has side effects tot his medication.

## 2021-06-23 NOTE — TELEPHONE ENCOUNTER
Gabapentin 300 mg prescribed today.  Nurse navigation to mail (or fax) him a chart but in the mean time, he can take 1 tablet at bedtime for three nights and on the fourth day add 1 tablet in the morning and continue with 1 tablet at bedtime (hopefully he will have received the chart by that time).  This is the last medication that can be prescribed without him returning for a follow-up appointment.   If he would prefer not to return and instead see the pain clinic, a referral can be placed.

## 2021-06-23 NOTE — TELEPHONE ENCOUNTER
Phone call to patient to discuss. Instructed on the first 7 days dosing. Explained that a dosing chart will be arriving with how to increase if needed. Also explained that the maximum is 900 mg three times a day, but he does not need to get to this level if finds relief at a lower dose. Most common side effects reviewed with patient. Stated understanding.     Chart was made. Will have scanned into chart and then mailed to patient.

## 2021-06-23 NOTE — TELEPHONE ENCOUNTER
"Patient calling with status update. \"I am still having the pain. I know she told me it could take 2 weeks to see results, but I have pain and am not sleeping. I am not having any side effects from this. Trying to ne proactive. I would like to try the other medication that she said would be an option.\"   "

## 2021-07-03 NOTE — ADDENDUM NOTE
Addendum Note by Ade Barron at 2/1/2018  1:29 PM     Author: Ade Barron Service: -- Author Type: Certified Medical Assistant    Filed: 2/1/2018  1:29 PM Encounter Date: 1/31/2018 Status: Signed    : Ade Barron    Addended by: ADE BARRON on: 2/1/2018 01:29 PM        Modules accepted: Orders

## 2021-07-03 NOTE — ADDENDUM NOTE
Addendum Note by Jeannette Pickens FNP at 2/1/2018  8:26 AM     Author: Jeannette Pickens FNP Service: -- Author Type: Nurse Practitioner    Filed: 2/1/2018  8:26 AM Encounter Date: 1/31/2018 Status: Signed    : Jeannette Pickens FNP (Nurse Practitioner)    Addended by: JEANNETTE PICKENS on: 2/1/2018 08:26 AM        Modules accepted: Orders

## 2021-10-02 ENCOUNTER — HEALTH MAINTENANCE LETTER (OUTPATIENT)
Age: 31
End: 2021-10-02

## 2022-07-09 ENCOUNTER — HEALTH MAINTENANCE LETTER (OUTPATIENT)
Age: 32
End: 2022-07-09

## 2022-09-03 ENCOUNTER — HEALTH MAINTENANCE LETTER (OUTPATIENT)
Age: 32
End: 2022-09-03

## 2022-12-02 ENCOUNTER — OFFICE VISIT (OUTPATIENT)
Dept: FAMILY MEDICINE | Facility: CLINIC | Age: 32
End: 2022-12-02
Payer: COMMERCIAL

## 2022-12-02 VITALS
SYSTOLIC BLOOD PRESSURE: 100 MMHG | HEIGHT: 66 IN | OXYGEN SATURATION: 97 % | TEMPERATURE: 98.3 F | HEART RATE: 80 BPM | WEIGHT: 146.2 LBS | BODY MASS INDEX: 23.5 KG/M2 | DIASTOLIC BLOOD PRESSURE: 60 MMHG

## 2022-12-02 DIAGNOSIS — R00.2 PALPITATIONS: ICD-10-CM

## 2022-12-02 DIAGNOSIS — K20.90 ESOPHAGITIS: ICD-10-CM

## 2022-12-02 DIAGNOSIS — Z01.818 PRE-OP EXAM: Primary | ICD-10-CM

## 2022-12-02 DIAGNOSIS — D72.829 LEUKOCYTOSIS, UNSPECIFIED TYPE: ICD-10-CM

## 2022-12-02 LAB
ANION GAP SERPL CALCULATED.3IONS-SCNC: 13 MMOL/L (ref 7–15)
BUN SERPL-MCNC: 13.8 MG/DL (ref 6–20)
CALCIUM SERPL-MCNC: 9.6 MG/DL (ref 8.6–10)
CHLORIDE SERPL-SCNC: 101 MMOL/L (ref 98–107)
CREAT SERPL-MCNC: 0.86 MG/DL (ref 0.67–1.17)
DEPRECATED HCO3 PLAS-SCNC: 27 MMOL/L (ref 22–29)
ERYTHROCYTE [DISTWIDTH] IN BLOOD BY AUTOMATED COUNT: 12.2 % (ref 10–15)
GFR SERPL CREATININE-BSD FRML MDRD: >90 ML/MIN/1.73M2
GLUCOSE SERPL-MCNC: 110 MG/DL (ref 70–99)
HCT VFR BLD AUTO: 42.5 % (ref 40–53)
HGB BLD-MCNC: 14.6 G/DL (ref 13.3–17.7)
MCH RBC QN AUTO: 30 PG (ref 26.5–33)
MCHC RBC AUTO-ENTMCNC: 34.4 G/DL (ref 31.5–36.5)
MCV RBC AUTO: 87 FL (ref 78–100)
PLATELET # BLD AUTO: 163 10E3/UL (ref 150–450)
POTASSIUM SERPL-SCNC: 4.3 MMOL/L (ref 3.4–5.3)
RBC # BLD AUTO: 4.87 10E6/UL (ref 4.4–5.9)
SODIUM SERPL-SCNC: 141 MMOL/L (ref 136–145)
WBC # BLD AUTO: 2.8 10E3/UL (ref 4–11)

## 2022-12-02 PROCEDURE — 85027 COMPLETE CBC AUTOMATED: CPT | Performed by: PHYSICIAN ASSISTANT

## 2022-12-02 PROCEDURE — 36415 COLL VENOUS BLD VENIPUNCTURE: CPT | Performed by: PHYSICIAN ASSISTANT

## 2022-12-02 PROCEDURE — 99214 OFFICE O/P EST MOD 30 MIN: CPT | Performed by: PHYSICIAN ASSISTANT

## 2022-12-02 PROCEDURE — 80048 BASIC METABOLIC PNL TOTAL CA: CPT | Performed by: PHYSICIAN ASSISTANT

## 2022-12-02 ASSESSMENT — ENCOUNTER SYMPTOMS
DYSURIA: 0
COUGH: 0
SORE THROAT: 0
ABDOMINAL PAIN: 0
COLOR CHANGE: 0
CHILLS: 0
EYE PAIN: 0
VOMITING: 0
FEVER: 0
PALPITATIONS: 0
SEIZURES: 0
ARTHRALGIAS: 0
HEMATURIA: 0
SHORTNESS OF BREATH: 0
BACK PAIN: 0

## 2022-12-02 ASSESSMENT — PAIN SCALES - GENERAL: PAINLEVEL: NO PAIN (0)

## 2022-12-02 NOTE — PROGRESS NOTES
[facility]  [address]  [place]  [facility] [address]  Phone: [phone]  Fax: [phone]  Primary Provider: System, Provider Not In  Pre-op Performing Provider: HAYDEN GARG      PREOPERATIVE EVALUATION:  Today's date: 12/2/2022    Marcial Nolasco is a 32 year old male who presents for a preoperative evaluation.    Surgical Information:  Surgery/Procedure: Septoplasty and Bilateral Inferior Turbin reduction  Surgery Location: [facility]  Surgeon: Dr. Spring  Surgery Date: 12/19/2022  Time of Surgery: TBD  Where patient plans to recover: At home with family  Fax number for surgical facility: [phone]    Type of Anesthesia Anticipated: General    Assessment & Plan     The proposed surgical procedure is considered LOW risk.      ICD-10-CM    1. Pre-op exam  Z01.818 CBC with platelets     Basic metabolic panel     CBC with platelets     Basic metabolic panel      2. Esophagitis  K20.90       3. Palpitations  R00.2         #1 preop physical-she will be undergoing a septoplasty and turbinate reduction on 12/19/2022.  He is feeling well and at his baseline.  We will check a CBC and a BMP today.    #2 chronic sinusitis-he will be undergoing septoplasty and bilateral turbinate reduction on 12/19/2022.  He continues to have sinus issues and snoring at night.    #3 esophagitis-symptoms are well controlled at this time.    #4 palpitations-he has not experienced any palpitations over the last 6 months or so.  He was having palpitations June/July 2021.  His TSH was within normal limits at that time.  He did undergo a Holter monitor which not show any concerning findings.  EKG at the time was normal.  He is otherwise asymptomatic at this time.  No further work-up is needed at this time    #5 tendinitis-he states has been having some tendinitis the last 2 months.  The left greater than right.  No associated hearing loss, headaches, or  dizziness.  He did have an upper respiratory infection at that time.  I do suspect that this is residual from the either the upper respiratory infection or all of his sinus problems he is having.  Negative neurological exam today.  He is to continue to monitor this.  Symptoms for prompt reevaluation were discussed in detail      Risks and Recommendations:  The patient has the following additional risks and recommendations for perioperative complications:   - No identified additional risk factors other than previously addressed    Medication Instructions:  Hold all NSAIDs, herbal supplements, and multivitamins 7 days prior to procedure    RECOMMENDATION:  APPROVAL GIVEN to proceed with proposed procedure, without further diagnostic evaluation.  [id number]    Subjective     HPI related to upcoming procedure: Marcial is a 32-year-old male who presents to the clinic today for a preop physical.  He will be undergoing septoplasty and a turbinate reduction on 12/19/2022 at [facility].  Past medical history significant for GERD and palpitations.  His reflux has been well controlled at this time.  He did have some palpitations back in 2021.  These have fully resolved and he has not noticed any new symptoms.  He denies any chest pain, shortness of breath, lightheaded or dizziness.  He states that no COVID test is needed.    Preop Questions 12/2/2022   1. Have you ever had a heart attack or stroke? No   2. Have you ever had surgery on your heart or blood vessels, such as a stent placement, a coronary artery bypass, or surgery on an artery in your head, neck, heart, or legs? No   3. Do you have chest pain with activity? No   4. Do you have a history of  heart failure? No   5. Do you currently have a cold, bronchitis or symptoms of other infection? No   6. Do you have a cough, shortness of breath, or wheezing? No   7. Do you or anyone in your family have previous history of blood clots? No   8. Do you or does anyone in  your family have a serious bleeding problem such as prolonged bleeding following surgeries or cuts? No   9. Have you ever had problems with anemia or been told to take iron pills? No   10. Have you had any abnormal blood loss such as black, tarry or bloody stools? No   11. Have you ever had a blood transfusion? No   12. Are you willing to have a blood transfusion if it is medically needed before, during, or after your surgery? Yes   13. Have you or any of your relatives ever had problems with anesthesia? No   14. Do you have sleep apnea, excessive snoring or daytime drowsiness? No   15. Do you have any artifical heart valves or other implanted medical devices like a pacemaker, defibrillator, or continuous glucose monitor? No   16. Do you have artificial joints? No   17. Are you allergic to latex? No       Health Care Directive:  Patient does not have a Health Care Directive or Living Will: Discussed advance care planning with patient; however, patient declined at this time.    Preoperative Review of :   reviewed - no record of controlled substances prescribed.          Review of Systems   Constitutional: Negative for chills and fever.   HENT: Positive for congestion. Negative for ear pain and sore throat.    Eyes: Negative for pain and visual disturbance.   Respiratory: Negative for cough and shortness of breath.    Cardiovascular: Negative for chest pain and palpitations.   Gastrointestinal: Negative for abdominal pain and vomiting.   Genitourinary: Negative for dysuria and hematuria.   Musculoskeletal: Negative for arthralgias and back pain.   Skin: Negative for color change and rash.   Neurological: Negative for seizures and syncope.   All other systems reviewed and are negative.        Patient Active Problem List    Diagnosis Date Noted     Esophagitis 02/26/2018     Priority: Medium     Gastritis 02/26/2018     Priority: Medium     ADHD, Predominantly Inattentive Type      Priority: Medium     Created by  "Conversion  Replacement Utility updated for latest IMO load         Sinus Bradycardia      Priority: Medium     Created by Conversion          Past Medical History:   Diagnosis Date     Attention deficit disorder without mention of hyperactivity     Created by Conversion      Past Surgical History:   Procedure Laterality Date     BIOPSY LIVER  2012     No current outpatient medications on file.       No Known Allergies     Social History     Tobacco Use     Smoking status: Never     Smokeless tobacco: Never   Substance Use Topics     Alcohol use: No     Family History   Problem Relation Age of Onset     Heart Disease Father      History   Drug Use No         Objective     /60 (BP Location: Right arm, Patient Position: Sitting, Cuff Size: Adult Large)   Pulse 80   Temp 98.3  F (36.8  C)   Ht 1.676 m (5' 6\")   Wt 66.3 kg (146 lb 3.2 oz)   SpO2 97%   BMI 23.60 kg/m      Physical Exam  Vitals and nursing note reviewed.   Constitutional:       General: He is not in acute distress.     Appearance: Normal appearance. He is not ill-appearing, toxic-appearing or diaphoretic.   HENT:      Head: Normocephalic and atraumatic.      Right Ear: Tympanic membrane, ear canal and external ear normal.      Left Ear: Tympanic membrane, ear canal and external ear normal.      Nose: No congestion.      Mouth/Throat:      Mouth: Mucous membranes are moist.      Pharynx: No oropharyngeal exudate or posterior oropharyngeal erythema.   Eyes:      General:         Right eye: No discharge.         Left eye: No discharge.      Conjunctiva/sclera: Conjunctivae normal.   Cardiovascular:      Rate and Rhythm: Normal rate and regular rhythm.      Heart sounds: No murmur heard.    No friction rub. No gallop.   Pulmonary:      Effort: Pulmonary effort is normal.      Breath sounds: No wheezing, rhonchi or rales.   Abdominal:      General: Abdomen is flat.      Palpations: Abdomen is soft.      Tenderness: There is no abdominal tenderness. " There is no right CVA tenderness, left CVA tenderness, guarding or rebound.      Hernia: No hernia is present.   Musculoskeletal:      Cervical back: Neck supple.   Skin:     General: Skin is warm.      Findings: No lesion or rash.   Neurological:      General: No focal deficit present.      Mental Status: He is alert and oriented to person, place, and time.      GCS: GCS eye subscore is 4. GCS verbal subscore is 5. GCS motor subscore is 6.      Cranial Nerves: No cranial nerve deficit.      Motor: No weakness.      Coordination: Finger-Nose-Finger Test normal.      Gait: Gait is intact.      Deep Tendon Reflexes:      Reflex Scores:       Patellar reflexes are 2+ on the right side and 2+ on the left side.          No results for input(s): HGB, PLT, INR, NA, POTASSIUM, CR, A1C in the last 03111 hours.     Diagnostics:  No results found for this or any previous visit (from the past 24 hour(s)).   No EKG required for low risk surgery (cataract, skin procedure, breast biopsy, etc).    Revised Cardiac Risk Index (RCRI):  The patient has the following serious cardiovascular risks for perioperative complications:   - No serious cardiac risks = 0 points     RCRI Interpretation: 0 points: Class I (very low risk - 0.4% complication rate)          Signed Electronically by: Frank Monique PA-C  Copy of this evaluation report is provided to requesting physician.

## 2022-12-05 ENCOUNTER — TELEPHONE (OUTPATIENT)
Dept: FAMILY MEDICINE | Facility: CLINIC | Age: 32
End: 2022-12-05

## 2022-12-05 NOTE — TELEPHONE ENCOUNTER
----- Message from Frank Monique PA-C sent at 12/3/2022  7:16 PM CST -----  Please call brain with labs   WBC was on the low end. I think we should recheck this in 2 week before his surgery. I have placed order for this.   BMP was within normal limits.

## 2022-12-05 NOTE — TELEPHONE ENCOUNTER
Called and LMTCB to schedule appt. If pt calls back, please relay msg below.    Sue Brizuela MA on 12/5/2022 at 9:24 AM

## 2023-04-18 ENCOUNTER — HOSPITAL ENCOUNTER (EMERGENCY)
Facility: CLINIC | Age: 33
Discharge: HOME | End: 2023-04-19
Attending: EMERGENCY MEDICINE | Admitting: EMERGENCY MEDICINE
Payer: COMMERCIAL

## 2023-04-18 DIAGNOSIS — M54.12 RADICULAR PAIN OF SHOULDER: ICD-10-CM

## 2023-04-18 DIAGNOSIS — M62.838 MUSCLE SPASM: ICD-10-CM

## 2023-04-18 PROCEDURE — 99284 EMERGENCY DEPT VISIT MOD MDM: CPT

## 2023-04-19 VITALS
WEIGHT: 145 LBS | TEMPERATURE: 99.1 F | DIASTOLIC BLOOD PRESSURE: 75 MMHG | OXYGEN SATURATION: 98 % | HEART RATE: 65 BPM | SYSTOLIC BLOOD PRESSURE: 117 MMHG | RESPIRATION RATE: 14 BRPM | BODY MASS INDEX: 23.4 KG/M2

## 2023-04-19 PROCEDURE — 250N000013 HC RX MED GY IP 250 OP 250 PS 637: Performed by: EMERGENCY MEDICINE

## 2023-04-19 RX ORDER — ONDANSETRON 8 MG/1
8 TABLET, ORALLY DISINTEGRATING ORAL EVERY 8 HOURS PRN
Qty: 12 TABLET | Refills: 0 | Status: SHIPPED | OUTPATIENT
Start: 2023-04-19 | End: 2023-12-04

## 2023-04-19 RX ORDER — DIAZEPAM 5 MG
5 TABLET ORAL ONCE
Status: COMPLETED | OUTPATIENT
Start: 2023-04-19 | End: 2023-04-19

## 2023-04-19 RX ORDER — OXYCODONE HYDROCHLORIDE 5 MG/1
5 TABLET ORAL ONCE
Status: COMPLETED | OUTPATIENT
Start: 2023-04-19 | End: 2023-04-19

## 2023-04-19 RX ORDER — CYCLOBENZAPRINE HCL 10 MG
10 TABLET ORAL 3 TIMES DAILY PRN
Qty: 20 TABLET | Refills: 0 | Status: SHIPPED | OUTPATIENT
Start: 2023-04-19 | End: 2023-12-04

## 2023-04-19 RX ORDER — ACETAMINOPHEN 325 MG/1
975 TABLET ORAL ONCE
Status: COMPLETED | OUTPATIENT
Start: 2023-04-19 | End: 2023-04-19

## 2023-04-19 RX ORDER — DIAZEPAM 5 MG
5 TABLET ORAL EVERY 6 HOURS PRN
Qty: 10 TABLET | Refills: 0 | Status: SHIPPED | OUTPATIENT
Start: 2023-04-19 | End: 2023-12-04

## 2023-04-19 RX ADMIN — DIAZEPAM 5 MG: 5 TABLET ORAL at 00:20

## 2023-04-19 RX ADMIN — ACETAMINOPHEN 975 MG: 325 TABLET ORAL at 00:16

## 2023-04-19 RX ADMIN — OXYCODONE HYDROCHLORIDE 5 MG: 5 TABLET ORAL at 00:16

## 2023-04-19 ASSESSMENT — ENCOUNTER SYMPTOMS
NECK PAIN: 1
SLEEP DISTURBANCE: 1

## 2023-04-19 NOTE — ED TRIAGE NOTES
Pt presents to the ED with c/o upper back and left sided neck pain. Pt reports that he was doing a golf simulator, had sudden onset pain with a swing. Pt endorses constant mild pain without moving, high pain with movement.      Triage Assessment     Row Name 04/18/23 2170       Triage Assessment (Adult)    Airway WDL WDL       Respiratory WDL    Respiratory WDL WDL       Skin Circulation/Temperature WDL    Skin Circulation/Temperature WDL WDL       Cardiac WDL    Cardiac WDL WDL       Peripheral/Neurovascular WDL    Peripheral Neurovascular WDL WDL       Cognitive/Neuro/Behavioral WDL    Cognitive/Neuro/Behavioral WDL WDL

## 2023-04-19 NOTE — ED NOTES
AVS reviewed and education provided to the patient. Writer emphasized that pt needs a ride home after taking pain meds, see MAR. Pt verbalized understanding and will call for a ride. VSS upon discharge to home/ self- care

## 2023-04-19 NOTE — ED PROVIDER NOTES
EMERGENCY DEPARTMENT ENCOUNTER      NAME: Marcial Nolasco  AGE: 32 year old male  YOB: 1990  MRN: [id number]  EVALUATION DATE & TIME: 4/18/2023 11:48 PM    PCP: System, Provider Not In    ED PROVIDER: Christine Rene M.D.      Chief Complaint   Patient presents with     Back Pain     Neck Pain         FINAL IMPRESSION:  1. Muscle spasm    2. Radicular pain of shoulder        MEDICAL DECISION MAKING:    Pertinent Labs & Imaging studies reviewed. (See chart for details)  ED Course as of 04/19/23 0120   Wed Apr 19, 2023   0022 Afebrile.  Vital signs here mild hypertension could be secondary to discomfort.  Patient is coming into the emergency department today for evaluation of left shoulder and neck pain.  Was using a golf simulator and was taking a swing when he felt sudden pain.  At home was trying lidocaine patches, stretching, ibuprofen and ice without significant relief.  Pain has gotten worse throughout the evening and tonight was preventing him from sleeping.  No discomfort down his arm.  He does have radicular pain when he tilts his neck down that radiates down to his left shoulder.  There was no falls or trauma.    Exam for patient here without any midline neck or back tenderness.  He does have some left-sided tenderness along his shoulder and some paraspinal tenderness.  Muscle spasming appreciated at rhomboid and trapezius muscle.  Equal strength in his bilateral upper extremities without sensory deficit.    Likely secondary to torn muscle versus muscle spasm with some nerve entrapment.  No need for any emergent imaging at this time.  We will try some Tylenol, Vicodin and oxycodone see if this does help.  He will find a ride home.     Patient did have some improvement with medications.  Discharged home with medications for symptomatic control.  Return for any new or worsening symptoms.    Medical Decision Making    History:    Supplemental history from: Documented in chart, if  applicable    External Record(s) reviewed: Documented in chart, if applicable.    Work Up:    Chart documentation includes differential considered and any EKGs or imaging independently interpreted by provider, where specified.    In additional to work up documented, I considered the following work up: Documented in chart, if applicable.    External consultation:    Discussion of management with another provider: Documented in chart, if applicable    Complicating factors:    Care impacted by chronic illness: N/A    Care affected by social determinants of health: N/A    Disposition considerations: Discharge. I prescribed additional prescription strength medication(s) as charted. See documentation for any additional details.          Critical care: 0 minutes excluding separately billable procedures.  Includes bedside management, time reviewing test results, review of records, discussing the case with staff, documenting the medical record and time spent with family members (or surrogate decision makers) discussing specific treatment issues.          ED COURSE:  12:04 AM I met with the patient, obtained history, performed an initial exam, and discussed options and plan for diagnostics and treatment here in the ED.  12:32 AM We discussed the plan for discharge and the patient is agreeable. Reviewed supportive cares, symptomatic treatment, outpatient follow up, and reasons to return to the Emergency Department. Patient to be discharged by ED RN.     The importance of close follow up was discussed. We reviewed warning signs and symptoms, and I instructed Mr. Nolasco to return to the emergency department immediately if he develops any new or worsening symptoms. I provided additional verbal discharge instructions. Mr. Nolasco expressed understanding and agreement with this plan of care, his questions were answered, and he was discharged in stable condition.     MEDICATIONS GIVEN IN THE EMERGENCY:  Medications   diazepam  (VALIUM) tablet 5 mg (5 mg Oral $Given 4/19/23 0020)   oxyCODONE (ROXICODONE) tablet 5 mg (5 mg Oral $Given 4/19/23 0016)   acetaminophen (TYLENOL) tablet 975 mg (975 mg Oral $Given 4/19/23 0016)       NEW PRESCRIPTIONS STARTED AT TODAY'S ER VISIT:  Discharge Medication List as of 4/19/2023 12:38 AM      START taking these medications    Details   cyclobenzaprine (FLEXERIL) 10 MG tablet Take 1 tablet (10 mg) by mouth 3 times daily as needed for muscle spasms, Disp-20 tablet, R-0, Local Print      diazepam (VALIUM) 5 MG tablet Take 1 tablet (5 mg) by mouth every 6 hours as needed for sleep or muscle spasms, Disp-10 tablet, R-0, Local Print      ondansetron (ZOFRAN ODT) 8 MG ODT tab Take 1 tablet (8 mg) by mouth every 8 hours as needed for nausea, Disp-12 tablet, R-0, Local Print                =================================================================    HPI    Patient information was obtained from: Patient     Use of : N/A        Marcial Nolasco is a 32 year old male who has a pertinent medical history of neck pain and pain in joint shoulder region who presents left shoulder pain and neck pain.    Patient reports of left shoulder pain and neck pain after using a golf simulator. The patient states that he was taking a swing when he felt sudden pain. He denies discomfort down his arm. He endorses radicular pain when he tilts his neck down that radiates down to his left shoulder. There was no falls or trauma. At home, the patient was trying lidocaine patches, stretching, ibuprofen and ice without significant relief. The patient reports that his pain has gotten worse throughout the evening and tonight was preventing him from sleeping, prompting him to be present in the ED. He otherwise denies any other symptoms or complaints at this time.     REVIEW OF SYSTEMS   Review of Systems   Musculoskeletal: Positive for neck pain.        Positive for shoulder pain and radicular pain.  Negative for falls/trauma.    Psychiatric/Behavioral: Positive for sleep disturbance (secondary to pain).   All other systems reviewed and are negative.        PAST MEDICAL HISTORY:  Past Medical History:   Diagnosis Date     Attention deficit disorder without mention of hyperactivity     Created by Conversion        PAST SURGICAL HISTORY:  Past Surgical History:   Procedure Laterality Date     BIOPSY LIVER  2012       CURRENT MEDICATIONS:    No current facility-administered medications for this encounter.    Current Outpatient Medications:      cyclobenzaprine (FLEXERIL) 10 MG tablet, Take 1 tablet (10 mg) by mouth 3 times daily as needed for muscle spasms, Disp: 20 tablet, Rfl: 0     diazepam (VALIUM) 5 MG tablet, Take 1 tablet (5 mg) by mouth every 6 hours as needed for sleep or muscle spasms, Disp: 10 tablet, Rfl: 0     ondansetron (ZOFRAN ODT) 8 MG ODT tab, Take 1 tablet (8 mg) by mouth every 8 hours as needed for nausea, Disp: 12 tablet, Rfl: 0    ALLERGIES:  No Known Allergies    FAMILY HISTORY:  Family History   Problem Relation Age of Onset     Heart Disease Father        SOCIAL HISTORY:   Social History     Socioeconomic History     Marital status: Single   Tobacco Use     Smoking status: Never     Smokeless tobacco: Never   Vaping Use     Vaping status: Never Used   Substance and Sexual Activity     Alcohol use: No     Drug use: No     Sexual activity: Yes     Partners: Female     Birth control/protection: None       PHYSICAL EXAM:    Vitals: /75   Pulse 65   Temp 99.1  F (37.3  C) (Temporal)   Resp 14   Wt 65.8 kg (145 lb)   SpO2 98%   BMI 23.40 kg/m     General:. Alert and interactive, comfortable appearing.  HENT: Oropharynx without erythema or exudates. MMM.  TMs clear bilaterally.  Eyes: Pupils mid-sized and equally reactive.   Neck: Full AROM.  No midline tenderness to palpation.  Cardiovascular: Regular rate and rhythm. Peripheral pulses 2+ bilaterally.  Chest/Pulmonary: Normal work of breathing. Lung sounds clear  and equal throughout, no wheezes or crackles. No chest wall tenderness or deformities.  Abdomen: Soft, nondistended. Nontender without guarding or rebound.  Back/Spine: Without any midline neck or back tenderness.   Extremities: Normal ROM of all major joints. No lower extremity edema. He does have some left-sided tenderness along his shoulder and some paraspinal tenderness. Muscle spasming appreciated at rhomboid and trapezius muscle. Equal strength in his bilateral upper extremities without sensory deficit.  Skin: Warm and dry. Normal skin color.   Neuro: Speech clear. CNs grossly intact. Moves all extremities appropriately. Strength and sensation grossly intact to all extremities.   Psych: Normal affect/mood, cooperative, memory appropriate.     LAB:  All pertinent labs reviewed and interpreted.  Labs Ordered and Resulted from Time of ED Arrival to Time of ED Departure - No data to display    RADIOLOGY:  No orders to display           I, Ede Castillo, am serving as a scribe to document services personally performed by Dr. Christine Rene  based on my observation and the provider's statements to me. I, Christine Rene MD attest that Ede Castillo is acting in a scribe capacity, has observed my performance of the services and has documented them in accordance with my direction.      Christine Rene M.D.  Emergency Medicine  [facility] EMERGENCY ROOM  [address] [address] [phone]  Dept: [phone]     Christine Rene MD  04/19/23 0121

## 2023-04-19 NOTE — DISCHARGE INSTRUCTIONS
You may also continue with the Lidoderm patches.  Use heat on the area as well.  Keep with the stretching exercises as this will help.  Take Flexeril during the day, you may take an additional Valium at night to help you sleep.  Alternate Tylenol and ibuprofen throughout the day to help you with your pain.  I did also prescribe some antinausea medication in case you did want to try the gabapentin for this pain as that may help significantly as well.

## 2023-12-04 ENCOUNTER — OFFICE VISIT (OUTPATIENT)
Dept: FAMILY MEDICINE | Facility: CLINIC | Age: 33
End: 2023-12-04
Payer: COMMERCIAL

## 2023-12-04 VITALS
WEIGHT: 149.6 LBS | OXYGEN SATURATION: 99 % | RESPIRATION RATE: 16 BRPM | HEIGHT: 67 IN | HEART RATE: 70 BPM | BODY MASS INDEX: 23.48 KG/M2 | DIASTOLIC BLOOD PRESSURE: 78 MMHG | SYSTOLIC BLOOD PRESSURE: 116 MMHG | TEMPERATURE: 98.5 F

## 2023-12-04 DIAGNOSIS — Z87.898 HISTORY OF MOTION SICKNESS: Primary | ICD-10-CM

## 2023-12-04 PROCEDURE — 99213 OFFICE O/P EST LOW 20 MIN: CPT | Performed by: FAMILY MEDICINE

## 2023-12-04 RX ORDER — SCOLOPAMINE TRANSDERMAL SYSTEM 1 MG/1
1 PATCH, EXTENDED RELEASE TRANSDERMAL
Qty: 4 PATCH | Refills: 3 | Status: SHIPPED | OUTPATIENT
Start: 2023-12-04

## 2023-12-04 ASSESSMENT — PAIN SCALES - GENERAL: PAINLEVEL: NO PAIN (0)

## 2023-12-04 NOTE — PROGRESS NOTES
"  Assessment & Plan   Problem List Items Addressed This Visit    None  Visit Diagnoses       History of motion sickness    -  Primary    Relevant Medications    scopolamine (TRANSDERM) 1 MG/3DAYS 72 hr patch             Patient is taking medicine in the past and has done well with it.  Reviewed schedule for taking it, patient to call with any other questions or concerns.                 JEF GARCIA MD  [facility] ART Ceja is a 33 year old, presenting for the following health issues:  motion sickness (Medication for cruise- scopolamine patches)        12/4/2023     7:49 AM   Additional Questions   Roomed by Dilcia CORDOBA LPN       History of Present Illness       Reason for visit:  Scopalamine RX    He eats 2-3 servings of fruits and vegetables daily.He consumes 1 sweetened beverage(s) daily.He exercises with enough effort to increase his heart rate 60 or more minutes per day.  He exercises with enough effort to increase his heart rate 3 or less days per week.   He is taking medications regularly.                 Review of Systems   Constitutional, HEENT, cardiovascular, pulmonary, gi and gu systems are negative, except as otherwise noted.      Objective    /78   Pulse 70   Temp 98.5  F (36.9  C) (Oral)   Resp 16   Ht 1.702 m (5' 7\")   Wt 67.9 kg (149 lb 9.6 oz)   SpO2 99%   BMI 23.43 kg/m    Body mass index is 23.43 kg/m .  Physical Exam   GENERAL: healthy, alert and no distress  NECK: no adenopathy, no asymmetry, masses, or scars and thyroid normal to palpation  RESP: lungs clear to auscultation - no rales, rhonchi or wheezes  CV: regular rate and rhythm, normal S1 S2, no S3 or S4, no murmur, click or rub, no peripheral edema and peripheral pulses strong  MS: no gross musculoskeletal defects noted, no edema                      "